# Patient Record
Sex: FEMALE | Race: WHITE | NOT HISPANIC OR LATINO | URBAN - METROPOLITAN AREA
[De-identification: names, ages, dates, MRNs, and addresses within clinical notes are randomized per-mention and may not be internally consistent; named-entity substitution may affect disease eponyms.]

---

## 2017-08-22 ENCOUNTER — OUTPATIENT (OUTPATIENT)
Dept: OUTPATIENT SERVICES | Facility: HOSPITAL | Age: 27
LOS: 1 days | End: 2017-08-22
Payer: COMMERCIAL

## 2017-08-22 DIAGNOSIS — Z01.818 ENCOUNTER FOR OTHER PREPROCEDURAL EXAMINATION: ICD-10-CM

## 2017-08-22 LAB
BLD GP AB SCN SERPL QL: POSITIVE — SIGNIFICANT CHANGE UP
HCT VFR BLD CALC: 36.1 % — SIGNIFICANT CHANGE UP (ref 34.5–45)
HGB BLD-MCNC: 11.5 G/DL — SIGNIFICANT CHANGE UP (ref 11.5–15.5)
MCHC RBC-ENTMCNC: 26.8 PG — LOW (ref 27–34)
MCHC RBC-ENTMCNC: 31.9 G/DL — LOW (ref 32–36)
MCV RBC AUTO: 84.1 FL — SIGNIFICANT CHANGE UP (ref 80–100)
PLATELET # BLD AUTO: 163 K/UL — SIGNIFICANT CHANGE UP (ref 150–400)
RBC # BLD: 4.29 M/UL — SIGNIFICANT CHANGE UP (ref 3.8–5.2)
RBC # FLD: 13.6 % — SIGNIFICANT CHANGE UP (ref 10.3–16.9)
RH IG SCN BLD-IMP: NEGATIVE — SIGNIFICANT CHANGE UP
WBC # BLD: 11.8 K/UL — HIGH (ref 3.8–10.5)
WBC # FLD AUTO: 11.8 K/UL — HIGH (ref 3.8–10.5)

## 2017-08-22 PROCEDURE — 86922 COMPATIBILITY TEST ANTIGLOB: CPT

## 2017-08-22 PROCEDURE — 86880 COOMBS TEST DIRECT: CPT

## 2017-08-22 PROCEDURE — 86850 RBC ANTIBODY SCREEN: CPT

## 2017-08-22 PROCEDURE — 86077 PHYS BLOOD BANK SERV XMATCH: CPT

## 2017-08-22 PROCEDURE — 86900 BLOOD TYPING SEROLOGIC ABO: CPT

## 2017-08-22 PROCEDURE — 86870 RBC ANTIBODY IDENTIFICATION: CPT

## 2017-08-22 PROCEDURE — 85027 COMPLETE CBC AUTOMATED: CPT

## 2017-08-22 PROCEDURE — 86921 COMPATIBILITY TEST INCUBATE: CPT

## 2017-08-22 PROCEDURE — 86901 BLOOD TYPING SEROLOGIC RH(D): CPT

## 2017-08-23 ENCOUNTER — INPATIENT (INPATIENT)
Facility: HOSPITAL | Age: 27
LOS: 2 days | Discharge: HOME | End: 2017-08-26
Attending: OBSTETRICS & GYNECOLOGY | Admitting: OBSTETRICS & GYNECOLOGY
Payer: COMMERCIAL

## 2017-08-23 VITALS — WEIGHT: 147.05 LBS | HEIGHT: 63 IN

## 2017-08-23 LAB
BLD GP AB SCN SERPL QL: POSITIVE — SIGNIFICANT CHANGE UP
RH IG SCN BLD-IMP: NEGATIVE — SIGNIFICANT CHANGE UP

## 2017-08-23 RX ORDER — ACETAMINOPHEN 500 MG
650 TABLET ORAL EVERY 6 HOURS
Qty: 0 | Refills: 0 | Status: DISCONTINUED | OUTPATIENT
Start: 2017-08-23 | End: 2017-08-24

## 2017-08-23 RX ORDER — OXYTOCIN 10 UNIT/ML
333.33 VIAL (ML) INJECTION
Qty: 20 | Refills: 0 | Status: DISCONTINUED | OUTPATIENT
Start: 2017-08-23 | End: 2017-08-26

## 2017-08-23 RX ORDER — METOCLOPRAMIDE HCL 10 MG
10 TABLET ORAL ONCE
Qty: 0 | Refills: 0 | Status: DISCONTINUED | OUTPATIENT
Start: 2017-08-23 | End: 2017-08-23

## 2017-08-23 RX ORDER — HEPARIN SODIUM 5000 [USP'U]/ML
5000 INJECTION INTRAVENOUS; SUBCUTANEOUS EVERY 12 HOURS
Qty: 0 | Refills: 0 | Status: DISCONTINUED | OUTPATIENT
Start: 2017-08-23 | End: 2017-08-26

## 2017-08-23 RX ORDER — OXYTOCIN 10 UNIT/ML
41.67 VIAL (ML) INJECTION
Qty: 20 | Refills: 0 | Status: DISCONTINUED | OUTPATIENT
Start: 2017-08-23 | End: 2017-08-26

## 2017-08-23 RX ORDER — CEFAZOLIN SODIUM 1 G
2000 VIAL (EA) INJECTION ONCE
Qty: 0 | Refills: 0 | Status: COMPLETED | OUTPATIENT
Start: 2017-08-23 | End: 2017-08-23

## 2017-08-23 RX ORDER — DOCUSATE SODIUM 100 MG
100 CAPSULE ORAL
Qty: 0 | Refills: 0 | Status: DISCONTINUED | OUTPATIENT
Start: 2017-08-23 | End: 2017-08-26

## 2017-08-23 RX ORDER — OXYTOCIN 10 UNIT/ML
41.67 VIAL (ML) INJECTION
Qty: 20 | Refills: 0 | Status: DISCONTINUED | OUTPATIENT
Start: 2017-08-23 | End: 2017-08-23

## 2017-08-23 RX ORDER — GLYCERIN ADULT
1 SUPPOSITORY, RECTAL RECTAL AT BEDTIME
Qty: 0 | Refills: 0 | Status: DISCONTINUED | OUTPATIENT
Start: 2017-08-23 | End: 2017-08-26

## 2017-08-23 RX ORDER — IBUPROFEN 200 MG
600 TABLET ORAL ONCE
Qty: 0 | Refills: 0 | Status: COMPLETED | OUTPATIENT
Start: 2017-08-23 | End: 2017-08-23

## 2017-08-23 RX ORDER — LANOLIN
1 OINTMENT (GRAM) TOPICAL
Qty: 0 | Refills: 0 | Status: DISCONTINUED | OUTPATIENT
Start: 2017-08-23 | End: 2017-08-26

## 2017-08-23 RX ORDER — DIPHENHYDRAMINE HCL 50 MG
25 CAPSULE ORAL EVERY 6 HOURS
Qty: 0 | Refills: 0 | Status: DISCONTINUED | OUTPATIENT
Start: 2017-08-23 | End: 2017-08-26

## 2017-08-23 RX ORDER — SODIUM CHLORIDE 9 MG/ML
1000 INJECTION, SOLUTION INTRAVENOUS
Qty: 0 | Refills: 0 | Status: DISCONTINUED | OUTPATIENT
Start: 2017-08-23 | End: 2017-08-26

## 2017-08-23 RX ORDER — SODIUM CHLORIDE 9 MG/ML
1000 INJECTION, SOLUTION INTRAVENOUS
Qty: 0 | Refills: 0 | Status: DISCONTINUED | OUTPATIENT
Start: 2017-08-23 | End: 2017-08-23

## 2017-08-23 RX ORDER — SODIUM CHLORIDE 9 MG/ML
1000 INJECTION, SOLUTION INTRAVENOUS ONCE
Qty: 0 | Refills: 0 | Status: COMPLETED | OUTPATIENT
Start: 2017-08-23 | End: 2017-08-23

## 2017-08-23 RX ORDER — IBUPROFEN 200 MG
600 TABLET ORAL EVERY 6 HOURS
Qty: 0 | Refills: 0 | Status: DISCONTINUED | OUTPATIENT
Start: 2017-08-23 | End: 2017-08-23

## 2017-08-23 RX ORDER — ACETAMINOPHEN 500 MG
650 TABLET ORAL EVERY 6 HOURS
Qty: 0 | Refills: 0 | Status: DISCONTINUED | OUTPATIENT
Start: 2017-08-23 | End: 2017-08-26

## 2017-08-23 RX ORDER — OXYCODONE AND ACETAMINOPHEN 5; 325 MG/1; MG/1
1 TABLET ORAL
Qty: 0 | Refills: 0 | Status: DISCONTINUED | OUTPATIENT
Start: 2017-08-23 | End: 2017-08-26

## 2017-08-23 RX ORDER — SIMETHICONE 80 MG/1
80 TABLET, CHEWABLE ORAL EVERY 4 HOURS
Qty: 0 | Refills: 0 | Status: DISCONTINUED | OUTPATIENT
Start: 2017-08-23 | End: 2017-08-26

## 2017-08-23 RX ORDER — TETANUS TOXOID, REDUCED DIPHTHERIA TOXOID AND ACELLULAR PERTUSSIS VACCINE, ADSORBED 5; 2.5; 8; 8; 2.5 [IU]/.5ML; [IU]/.5ML; UG/.5ML; UG/.5ML; UG/.5ML
0.5 SUSPENSION INTRAMUSCULAR ONCE
Qty: 0 | Refills: 0 | Status: DISCONTINUED | OUTPATIENT
Start: 2017-08-23 | End: 2017-08-26

## 2017-08-23 RX ORDER — CITRIC ACID/SODIUM CITRATE 300-500 MG
30 SOLUTION, ORAL ORAL ONCE
Qty: 0 | Refills: 0 | Status: COMPLETED | OUTPATIENT
Start: 2017-08-23 | End: 2017-08-23

## 2017-08-23 RX ORDER — OXYCODONE AND ACETAMINOPHEN 5; 325 MG/1; MG/1
2 TABLET ORAL EVERY 6 HOURS
Qty: 0 | Refills: 0 | Status: DISCONTINUED | OUTPATIENT
Start: 2017-08-23 | End: 2017-08-26

## 2017-08-23 RX ORDER — IBUPROFEN 200 MG
600 TABLET ORAL EVERY 6 HOURS
Qty: 0 | Refills: 0 | Status: DISCONTINUED | OUTPATIENT
Start: 2017-08-23 | End: 2017-08-24

## 2017-08-23 RX ORDER — FERROUS SULFATE 325(65) MG
325 TABLET ORAL DAILY
Qty: 0 | Refills: 0 | Status: DISCONTINUED | OUTPATIENT
Start: 2017-08-23 | End: 2017-08-26

## 2017-08-23 RX ADMIN — SODIUM CHLORIDE 125 MILLILITER(S): 9 INJECTION, SOLUTION INTRAVENOUS at 18:33

## 2017-08-23 RX ADMIN — SODIUM CHLORIDE 2000 MILLILITER(S): 9 INJECTION, SOLUTION INTRAVENOUS at 07:28

## 2017-08-23 RX ADMIN — Medication 600 MILLIGRAM(S): at 18:31

## 2017-08-23 RX ADMIN — Medication 600 MILLIGRAM(S): at 23:52

## 2017-08-23 RX ADMIN — SODIUM CHLORIDE 125 MILLILITER(S): 9 INJECTION, SOLUTION INTRAVENOUS at 07:15

## 2017-08-23 RX ADMIN — Medication 100 MILLIGRAM(S): at 07:30

## 2017-08-23 RX ADMIN — HEPARIN SODIUM 5000 UNIT(S): 5000 INJECTION INTRAVENOUS; SUBCUTANEOUS at 20:47

## 2017-08-23 RX ADMIN — Medication 30 MILLILITER(S): at 07:30

## 2017-08-23 RX ADMIN — Medication 600 MILLIGRAM(S): at 19:03

## 2017-08-23 NOTE — DISCHARGE NOTE OB - PATIENT PORTAL LINK FT
“You can access the [facility] Patient Portal, offered by [facility], by registering with the following website: [email or web address]”

## 2017-08-23 NOTE — DISCHARGE NOTE OB - CARE PLAN
Principal Discharge DX:	Postpartum state  Goal:	home  Instructions for follow-up, activity and diet:	md office in 1 and 5 weeks, nothing in vagina for 6 weeks.

## 2017-08-23 NOTE — DISCHARGE NOTE OB - CARE PROVIDER_API CALL
Josh Weeks), Obstetrics and Gynecology  [facility]  [address]  [place] [address]  Phone: [phone]  Fax: [phone]

## 2017-08-24 LAB
HCT VFR BLD CALC: 35.6 % — SIGNIFICANT CHANGE UP (ref 34.5–45)
HGB BLD-MCNC: 11.3 G/DL — LOW (ref 11.5–15.5)
MCHC RBC-ENTMCNC: 27.2 PG — SIGNIFICANT CHANGE UP (ref 27–34)
MCHC RBC-ENTMCNC: 31.7 G/DL — LOW (ref 32–36)
MCV RBC AUTO: 85.8 FL — SIGNIFICANT CHANGE UP (ref 80–100)
PLATELET # BLD AUTO: 178 K/UL — SIGNIFICANT CHANGE UP (ref 150–400)
RBC # BLD: 4.15 M/UL — SIGNIFICANT CHANGE UP (ref 3.8–5.2)
RBC # FLD: 13.7 % — SIGNIFICANT CHANGE UP (ref 10.3–16.9)
T PALLIDUM AB TITR SER: NEGATIVE — SIGNIFICANT CHANGE UP
WBC # BLD: 13.6 K/UL — HIGH (ref 3.8–10.5)
WBC # FLD AUTO: 13.6 K/UL — HIGH (ref 3.8–10.5)

## 2017-08-24 RX ORDER — IBUPROFEN 200 MG
600 TABLET ORAL EVERY 6 HOURS
Qty: 0 | Refills: 0 | Status: DISCONTINUED | OUTPATIENT
Start: 2017-08-24 | End: 2017-08-26

## 2017-08-24 RX ORDER — ACETAMINOPHEN 500 MG
650 TABLET ORAL EVERY 6 HOURS
Qty: 0 | Refills: 0 | Status: DISCONTINUED | OUTPATIENT
Start: 2017-08-24 | End: 2017-08-26

## 2017-08-24 RX ORDER — ACETAMINOPHEN 500 MG
650 TABLET ORAL EVERY 6 HOURS
Qty: 0 | Refills: 0 | Status: DISCONTINUED | OUTPATIENT
Start: 2017-08-24 | End: 2017-08-24

## 2017-08-24 RX ADMIN — Medication 1 APPLICATION(S): at 12:10

## 2017-08-24 RX ADMIN — Medication 1 TABLET(S): at 11:33

## 2017-08-24 RX ADMIN — Medication 650 MILLIGRAM(S): at 11:30

## 2017-08-24 RX ADMIN — Medication 600 MILLIGRAM(S): at 00:40

## 2017-08-24 RX ADMIN — Medication 650 MILLIGRAM(S): at 10:11

## 2017-08-24 RX ADMIN — Medication 100 MILLIGRAM(S): at 11:34

## 2017-08-24 RX ADMIN — HEPARIN SODIUM 5000 UNIT(S): 5000 INJECTION INTRAVENOUS; SUBCUTANEOUS at 20:40

## 2017-08-24 RX ADMIN — Medication 600 MILLIGRAM(S): at 12:15

## 2017-08-24 RX ADMIN — HEPARIN SODIUM 5000 UNIT(S): 5000 INJECTION INTRAVENOUS; SUBCUTANEOUS at 09:46

## 2017-08-24 RX ADMIN — Medication 600 MILLIGRAM(S): at 17:15

## 2017-08-24 RX ADMIN — Medication 600 MILLIGRAM(S): at 05:38

## 2017-08-24 RX ADMIN — Medication 325 MILLIGRAM(S): at 11:33

## 2017-08-24 RX ADMIN — Medication 600 MILLIGRAM(S): at 18:15

## 2017-08-24 RX ADMIN — Medication 600 MILLIGRAM(S): at 11:34

## 2017-08-24 RX ADMIN — Medication 650 MILLIGRAM(S): at 16:30

## 2017-08-24 RX ADMIN — Medication 600 MILLIGRAM(S): at 06:30

## 2017-08-24 RX ADMIN — Medication 650 MILLIGRAM(S): at 15:52

## 2017-08-24 NOTE — PROGRESS NOTE ADULT - ASSESSMENT
A/P  25yo  s/p c/s, POD #1  ,stable  1. Pain: well controlled on PO pain meds  2. GI: regular diet  3. : voiding  4. DVT prophylaxis: SQH, ambulation  5. Dispo: POD 3 or 4

## 2017-08-24 NOTE — LACTATION INITIAL EVALUATION - INFANT FEEDING PLAN COMMENT, OB PROFILE
day 2 of life, mom reports cracked left nipple, has been supplementing with formula. plans to bf 1 month and then wean to formula. assisted to deepen latch in cross cradle hold. discussed risks of formula supplementation on bfing and milk production. observed deep attachment with slow, rhythmic suck. mom reports comfort. reviewed guidelines. encouraged s2s and feeding on demand.

## 2017-08-24 NOTE — PROGRESS NOTE ADULT - SUBJECTIVE AND OBJECTIVE BOX
Patient evaluated at bedside.   She reports pain is well controlled.  She denies heavy vaginal bleeding.  She has been ambulating without assistance, voiding spontaneously, passing gas, tolerating regular diet and is breastfeeding.    Physical Exam:  Vital Signs Last 24 Hrs  T(C): 36.9 (24 Aug 2017 06:22), Max: 36.9 (24 Aug 2017 06:22)  T(F): 98.5 (24 Aug 2017 06:22), Max: 98.5 (24 Aug 2017 06:22)  HR: 77 (24 Aug 2017 06:22) (66 - 96)  BP: 97/64 (24 Aug 2017 06:22) (89/52 - 106/58)  BP(mean): --  RR: 16 (24 Aug 2017 06:22) (14 - 18)  SpO2: 95% (24 Aug 2017 06:22) (95% - 100%)    GA: NAD, A+0 x 3  Abd: soft, nontender, nondistended, no rebound or guarding, incision clean, dry and intact, uterus firm at midline,  below the umbilicus  : lochia WNL  Extremities: no calf tenderness                            11.5   11.8  )-----------( 163      ( 22 Aug 2017 11:12 )             36.1

## 2017-08-25 RX ADMIN — Medication 600 MILLIGRAM(S): at 03:00

## 2017-08-25 RX ADMIN — Medication 600 MILLIGRAM(S): at 13:52

## 2017-08-25 RX ADMIN — Medication 1 TABLET(S): at 08:48

## 2017-08-25 RX ADMIN — HEPARIN SODIUM 5000 UNIT(S): 5000 INJECTION INTRAVENOUS; SUBCUTANEOUS at 08:48

## 2017-08-25 RX ADMIN — HEPARIN SODIUM 5000 UNIT(S): 5000 INJECTION INTRAVENOUS; SUBCUTANEOUS at 20:58

## 2017-08-25 RX ADMIN — Medication 600 MILLIGRAM(S): at 02:07

## 2017-08-25 RX ADMIN — Medication 600 MILLIGRAM(S): at 14:30

## 2017-08-25 RX ADMIN — Medication 325 MILLIGRAM(S): at 08:48

## 2017-08-25 NOTE — PROGRESS NOTE ADULT - SUBJECTIVE AND OBJECTIVE BOX
Patient evaluated at bedside.   She reports pain is well controlled.  She denies heavy vaginal bleeding.  She has been ambulating without assistance, voiding spontaneously, passing gas, tolerating regular diet and is breastfeeding.    Physical Exam:  Vital Signs Last 24 Hrs  T(C): 36.7 (25 Aug 2017 06:17), Max: 36.8 (24 Aug 2017 14:02)  T(F): 98 (25 Aug 2017 06:17), Max: 98.3 (24 Aug 2017 14:02)  HR: 81 (25 Aug 2017 06:17) (80 - 82)  BP: 98/60 (25 Aug 2017 06:17) (94/64 - 98/60)  BP(mean): --  RR: 16 (25 Aug 2017 06:17) (16 - 16)  SpO2: 98% (25 Aug 2017 06:17) (98% - 98%)    GA: NAD, A+0 x 3  Abd: soft, nontender, nondistended, no rebound or guarding, incision clean, dry and intact, uterus firm at midline,  below the umbilicus  : lochia WNL  Extremities: no calf tenderness                            11.3   13.6  )-----------( 178      ( 24 Aug 2017 11:38 )             35.6

## 2017-08-25 NOTE — PROGRESS NOTE ADULT - ASSESSMENT
A/P  27yo  s/p c/s, POD #2  ,stable  1. Pain: well controlled on PO pain meds  2. GI: regular  3. : voiding  4. DVT prophylaxis: sqh, ambulation  5. Dispo: pod 3 or 4

## 2017-08-26 VITALS
DIASTOLIC BLOOD PRESSURE: 74 MMHG | HEART RATE: 94 BPM | SYSTOLIC BLOOD PRESSURE: 113 MMHG | RESPIRATION RATE: 16 BRPM | TEMPERATURE: 98 F | OXYGEN SATURATION: 100 %

## 2017-08-26 PROCEDURE — 86780 TREPONEMA PALLIDUM: CPT

## 2017-08-26 PROCEDURE — 86900 BLOOD TYPING SEROLOGIC ABO: CPT

## 2017-08-26 PROCEDURE — 86850 RBC ANTIBODY SCREEN: CPT

## 2017-08-26 PROCEDURE — 86870 RBC ANTIBODY IDENTIFICATION: CPT

## 2017-08-26 PROCEDURE — 85027 COMPLETE CBC AUTOMATED: CPT

## 2017-08-26 PROCEDURE — 36415 COLL VENOUS BLD VENIPUNCTURE: CPT

## 2017-08-26 PROCEDURE — 86901 BLOOD TYPING SEROLOGIC RH(D): CPT

## 2017-08-26 RX ADMIN — Medication 325 MILLIGRAM(S): at 11:54

## 2017-08-26 RX ADMIN — Medication 1 TABLET(S): at 11:54

## 2017-08-26 NOTE — PROGRESS NOTE ADULT - ASSESSMENT
A/P  25yo  s/p c/s, POD #3  ,stable  1. Pain: well controlled on PO pain meds  2. GI: regular  3. : voiding  4. DVT prophylaxis: sqh, ambulation  5. Dispo: pod 3 or 4

## 2017-08-26 NOTE — PROGRESS NOTE ADULT - SUBJECTIVE AND OBJECTIVE BOX
Patient evaluated at bedside.   She reports pain is well controlled.  She denies heavy vaginal bleeding.  She has been ambulating without assistance, voiding spontaneously, passing gas, tolerating regular diet and is breastfeeding.    Vital Signs Last 24 Hrs  T(C): 36.7 (26 Aug 2017 05:47), Max: 36.8 (25 Aug 2017 10:00)  T(F): 98.1 (26 Aug 2017 05:47), Max: 98.2 (25 Aug 2017 10:00)  HR: 94 (26 Aug 2017 05:47) (74 - 94)  BP: 113/74 (26 Aug 2017 05:47) (98/60 - 117/76)  BP(mean): --  RR: 16 (26 Aug 2017 05:47) (16 - 16)  SpO2: 100% (26 Aug 2017 05:47) (98% - 100%)    GA: NAD, A+0 x 3  Abd: soft, nontender, nondistended, no rebound or guarding, provera wound vac clean, dry and intact, uterus firm at midline, 1fb below the umbilicus  : lochia WNL  Extremities: no calf tenderness                            11.3   13.6  )-----------( 178      ( 24 Aug 2017 11:38 )             35.6

## 2017-08-30 DIAGNOSIS — Z3A.39 39 WEEKS GESTATION OF PREGNANCY: ICD-10-CM

## 2017-08-30 DIAGNOSIS — O34.211 MATERNAL CARE FOR LOW TRANSVERSE SCAR FROM PREVIOUS CESAREAN DELIVERY: ICD-10-CM

## 2017-08-30 DIAGNOSIS — Z87.410 PERSONAL HISTORY OF CERVICAL DYSPLASIA: ICD-10-CM

## 2018-02-15 NOTE — PATIENT PROFILE OB - BREAST MILK PROVIDES PROTECTION AGAINST INFECTION
Statement Selected
Include Location In Plan?: Yes
Detail Level: Generalized
Additional Note: Normal full body skin exam, follow up annually or sooner if there are any changes

## 2023-09-22 NOTE — DISCHARGE NOTE OB - BREASTFEEDING PROVIDES MATERNAL HEALTH BENEFITS, DECREASED PREMENOPAUSAL BREAST CANCER, OVARIAN CANCER AND TYPE II DIABETES MELLITUS
COVID-19 (Coronavirus Disease 2019)    WHAT YOU NEED TO KNOW:    What do I need to know about coronavirus disease 2019 (COVID-19)? COVID-19 is the disease caused by the novel (new) coronavirus first discovered in December 2019. Coronaviruses generally cause upper respiratory (nose, throat, and lung) infections, such as a cold. The new virus can also cause serious lower respiratory conditions, such as pneumonia or acute respiratory distress syndrome (ARDS). Anyone can develop serious problems from the new virus, but your risk is higher if you are 65 or older. A weak immune system, diabetes, or a heart or lung condition can also increase your risk.    What are the signs and symptoms of COVID-19? You may not develop any signs or symptoms. Signs and symptoms that do develop usually start about 5 days after infection but can take 2 to 14 days. Signs and symptoms range from mild to severe. You may feel like you have the flu or a bad cold. Information on COVID-19 is still being learned. Tell your healthcare provider if you think you were infected but develop signs or symptoms not listed below:  •A cough  •Shortness of breath or trouble breathing that may become severe  •A fever of at least 100.4°F, or 38°C (may be lower in adults 65 or older)  •Chills that might include shaking  •Muscle pain, body aches, or a headache  •A sore throat  •Suddenly not being able to taste or smell anything  •Feeling mentally and physically tired (fatigue)  •Congestion (stuffy head and nose), or a runny nose.  •Diarrhea, nausea, or vomiting    How is COVID-19 diagnosed? If you think you have COVID-19, call your healthcare provider. In some areas, testing is only done if a person has severe symptoms or is hospitalized. Testing is done more widely in other places. Your provider will tell you what to do based on your symptoms and the rules in your area. In general, the following may be used:   •A viral test shows if you have a current infection. Samples are taken from your nose and throat, usually with swabs. You may need to wait several days to get the test results. Your healthcare provider will tell you how to get your results. You will need to quarantine (stay physically away from others) until you get your results. If results show you have COVID-19, you will need to quarantine until you are well. Your provider or other health official may give you more directions. You will also need to prevent another infection until it is known if you can get COVID-19 again.  •An antibody test shows if you had a past infection. Blood samples are used for this test. Antibodies are made by your immune system to attack the virus that causes COVID-19. Antibodies will form 1 to 3 weeks after you are infected. It is not known if antibodies prevent a second infection, or for how long a person might be protected. If you have antibodies, you will still need to be careful around others until more is known.  •CT scans or x-rays may be used to check for signs of pneumonia. The 2019 coronavirus causes a specific kind of pneumonia, usually in both lungs.      How is COVID-19 treated? No medicine or specific treatment is currently approved for COVID-19. The following may be used to manage your symptoms or treat the effects of COVID-19:   •Mild symptoms may get better on their own. If you do not need to be treated in a hospital, you will be given instructions to use at home. Your condition will be closely monitored. You will need to watch for worsening symptoms and seek immediate care if needed. Talk to your healthcare provider about the following:?Relieve your symptoms. To soothe a sore throat, gargle with warm salt water, or use throat lozenges or a throat spray. Your healthcare provider may recommend a cough medicine. Drink more liquids to thin and loosen mucus and to prevent dehydration. Use decongestants or saline drops as directed for nasal congestion.  ?NSAIDs or acetaminophen can help lower a fever and relieve body aches or a headache. Follow directions. If not taken correctly, NSAIDs can cause kidney damage and acetaminophen can cause liver damage.    •Severe or life-threatening symptoms are treated in the hospital. You may need a combination of the following:?Medicines may be given to reduce inflammation or to fight the virus. You may also need blood thinners to prevent or treat blood clots. If you have a deep vein thrombosis (DVT) or pulmonary embolism (PE), you may need to keep using blood thinners for 3 months.  ?Extra oxygen may be given if you have respiratory failure. This means your lungs cannot get enough oxygen into your blood and out to your organs. Extra oxygen can help prevent organ failure.  ?A ventilator may be used to help you breathe.  ?Convalescent plasma (part of blood) from a patient who has recovered from COVID-19 may be used. The plasma contains antibodies that can help your body fight the infection. Convalescent plasma is only given to patients who have severe signs and symptoms.        How does the 2019 coronavirus spread? The virus spreads quickly and easily. You can become infected if you are in contact with a large amount of the virus, even for a short time. You can also become infected by being around a small amount of virus for a long time. The following are ways the virus is thought to spread, but more information may be coming:   •Droplets are the most common way all coronaviruses spread. The virus can travel in droplets that form when a person talks, coughs, or sneezes. Anyone who breathes in the droplets or gets them in his or her eyes can become infected with the virus. Close personal contact with an infected person is thought to be the main way the virus spreads. Close personal contact means you are within 6 feet (2 meters) of the person.  •Person-to-person contact can spread the virus. For example, a person with the virus on his or her hands can spread it by shaking hands with someone. At this time, it does not appear that the virus can be passed to a baby during pregnancy or delivery. The baby can be infected after he or she is born through person-to-person contact. The virus also does not appear to spread in breast milk. If you are pregnant or breastfeeding, talk to your healthcare provider or obstetrician about any concerns you have.  •The virus can stay on objects and surfaces. A person can get the virus on his or her hands by touching the object or surface. Infection happens if the person then touches his or her eyes or mouth with unwashed hands. It is not yet known how long the virus can stay on an object or surface. That is why it is important to clean all surfaces that are used regularly.  •An infected animal may be able to infect a person who touches it. This may happen at live markets or on a farm.      How can everyone lower the risk for COVID-19? The best way to prevent infection is to avoid anyone who is infected, but this can be hard to do. An infected person can spread the virus before signs or symptoms begin, or even if signs or symptoms never develop. The following can help lower the risk for infection:   Limit the Spread of Infectious Disease    •Wash your hands often throughout the day. Use soap and water. Rub your soapy hands together, lacing your fingers. Wash the front and back of each hand, and in between your fingers. Use the fingers of one hand to scrub under the fingernails of the other hand. Wash for at least 20 seconds. Rinse with warm, running water for several seconds. Then dry your hands with a clean towel or paper towel. Use hand  that contains alcohol if soap and water are not available. Do not touch your eyes, nose, or mouth without washing your hands first. Teach children how to wash their hands and use hand .  Handwashing  •Cover a sneeze or cough. This prevents droplets from traveling from you to others. Turn your face away and cover your mouth and nose with a tissue. Throw the tissue away. Use the bend of your arm if a tissue is not available. Then wash your hands well with soap and water or use hand . Turn and cover your face if you are around someone who is sneezing or coughing. Teach children how to cover a cough or sneeze.  •Follow worldwide, national, and local social distancing guidelines. Social distancing means people avoid close physical contact so the virus cannot spread from one person to another. Keep at least 6 feet (2 meters) between you and others. Also keep this distance from anyone who comes to your home, such as someone making a delivery.  •Make a habit of not touching your face. It is not known how long the virus can stay on objects and surfaces. If you get the virus on your hands, you can transfer it to your eyes, nose, or mouth and become infected. You can also transfer it to objects, surfaces, or people. Be aware of what you touch when you go out. Examples include handrails and elevator buttons. Try not to touch anything with bare hands unless it is necessary. Wash your hands before you leave your home and when you return.  •Clean and disinfect high-touch surfaces and objects often. Use a disinfecting solution or wipes. You can make a solution by diluting 4 teaspoons of bleach in 1 quart (4 cups) of water. Clean and disinfect even if you think no one living in or coming to your home is infected with the virus. You can wipe items with a disinfecting cloth before you bring them into your home. Wash your hands after you handle anything you bring into your home.  •Make your immune system as healthy as possible. A weakened immune system makes you more vulnerable to the new coronavirus. No COVID-19 vaccine is available yet. Vaccines such as the flu and pneumonia vaccines can help your immune system. Your healthcare provider can tell you which vaccines to get, and when to get them. Keep your immune system as strong as possible. Do not smoke. Eat healthy foods, exercise regularly, and try to manage stress. Go to bed and wake up at the same times each day.        How do I follow social distancing guidelines to help lower the risk for COVID-19? National and local social distancing rules vary. Rules may change over time as restrictions are lifted. Restrictions may return if an outbreak happens where you live. It is important to know and follow all current social distancing rules in your area. The following are general guidelines:  •Limit trips out of your home. You may be able to have food, medicines, and other supplies delivered. If possible, have delivered items left at your door or other area. Try not to have someone hand you an item. You will be so close to the person that the virus can spread between you.  •Do not have close physical contact with anyone who does not live in your home. Do not shake hands with, hug, or kiss a person as a greeting. Stand or walk as far from others as possible. If you must use public transportation (such as a bus or subway), try to sit or stand away from others. You can stay safely connected with others through phone calls, e-mail messages, social media websites, and video chats. Check in on anyone who may be having a hard time socially distancing, or who lives alone. Ask administrators at nursing homes or long-term care facilities how you can safely communicate with someone living there.  •Wear a cloth face covering around others who do not live in your home. Face coverings help prevent the virus from spreading to others in droplets. You can use a clear face covering if someone needs to read your lips. This is a cloth covering that has plastic over the mouth area so your lips can be seen. Do not use coverings that have breathing valves or vents. The virus can travel out of the valve or vent and be spread to others. Do not take your covering off to talk, cough, or sneeze. Do not use coverings on children younger than 2 years or on anyone who has breathing problems or cannot remove it.  •Only allow medical or other necessary professionals into your home. Wear your face covering, and remind professionals to wear a face covering. Remind them to wash their hands when they arrive and before they leave. Do not let anyone who does not live in your home in, even if the person is not sick. A person can pass the virus to others before symptoms of COVID-19 begin. Some people never even develop symptoms. Children commonly have mild symptoms or no symptoms. It may be hard to tell a child not to hug or kiss you. Explain that this is how he or she can help you stay healthy.  •Do not go to someone else's home unless it is necessary. Do not go over to visit, even if the person is lonely. Only go if you need to help him or her. Make sure you both wear face coverings while you are there.  •Avoid large gatherings and crowds. Gatherings or crowds of 10 or more individuals can cause the virus to spread. Examples of gatherings include parties, sporting events, [employer] services, and conferences. Crowds may form at beaches, nicole, and tourist attractions. Protect yourself by staying away from large gatherings and crowds.  •Ask your healthcare provider for other ways to have appointments. You may be able to have appointments without having to go into the provider's office. Some providers offer phone, video, or other types of appointments. You may also be able to get prescriptions for a few months of your medicines at a time.  •Stay safe if you must go out to work. You may have a job that can only be done outside your home. Keep physical distance between you and other workers as much as possible. Follow your employer's rules so everyone stays safe.      What should I do if I have COVID-19 and am recovering at home? Healthcare providers will give you specific instructions to follow. The following are general guidelines to remind you how to keep others safe until you are well:   •Wash your hands often. Use soap and water as much as possible. You can use hand  that contains alcohol if soap and water are not available. Do not share towels with anyone. If you use paper towels, throw them away in a lined trash can kept in your room or area. Use a covered trash can, if possible.  •Do not go out of your home unless it is necessary. You may have to go to your healthcare provider's office for check-ups or to get prescription refills. Do not arrive at the provider's office without an appointment. Providers have to make their offices safe for staff and other patients.  •Do not have close physical contact with anyone unless it is necessary. Only have close physical contact with a person giving direct care, or a baby or child you must care for. Family members and friends should not visit you. If possible, stay in a separate area or room of your home if you live with others. No one should go into the area or room except to give you care. You can visit with others by phone, video chat, e-mail, or similar systems. It is important to stay connected with others in your life while you recover.  •Wear a face covering while others are near you. This can help prevent droplets from spreading the virus when you talk, sneeze, or cough. Put the covering on before anyone comes into your room or area. Remind the person to cover his or her nose and mouth before going in to provide care for you.  •Do not share items. Do not share dishes, towels, or other items with anyone. Items need to be washed after you use them.  •Protect your baby. Wash your hands with soap and water often throughout the day. Wear a clean face covering while you breastfeed, or while you express or pump breast milk. If possible, ask someone who is well to care for your baby. You can put breast milk in bottles for the person to use, if needed. Talk to your healthcare provider if you have any questions or concerns about caring for or bonding with your baby. He or she will tell you when to bring your baby in for check-ups and vaccines. He or she will also tell you what to do if you think your baby was infected with the new virus.  •Do not handle live animals. Until more is known, it is best not to touch, play with, or handle live animals. Some animals, including pets, have been infected with the new coronavirus. Do not handle or care for animals until you are well. Care includes feeding, petting, and cuddling your pet. Do not let your pet lick you or share your food. Ask someone who is not infected to take care of your pet, if possible. If you must care for a pet, wear a face covering. Wash your hands before and after you give care.  •Follow directions from your healthcare provider for being around others after you recover. You will need to wait at least 10 days after symptoms first appeared. Then you will need to have no fever for 24 hours without fever medicine, and no other symptoms. A loss of taste or smell may continue for several months. It is considered okay to be around others if this is your only symptom. It is not known for sure if or for how long a recovered person can pass the virus to others. Your provider may give you instructions, such as continuing social distancing or wearing a face covering around others.  How should I take care of someone who has COVID-19? If the person lives in another home, arrange for a time to give care. Remember to bring a few pairs of disposable gloves and a cloth face covering. The following are general guidelines to help you safely care for anyone who has COVID-19:  •Wash your hands often. Wash before and after you go into the person's home, area, or room. Throw paper towels away in a lined trash can that has a lid, if possible.  •Do not allow others to go near the person. No one should come into the person's home unless it is necessary. If possible, the person should be in a separate area or room if he or she lives with others. Keep the room's door shut unless you need to go in or out. Have others call, video chat, or e-mail the person if he or she is feeling well enough. The person may feel lonely if he or she is kept separate for a long period of time. Safe communication can help him or her stay connected to family and friends.  •Make sure the person's room has good air flow. You may be able to open the window if the weather allows. An air conditioner can also be turned on to help air move.  •Contact the person before you go in to give care. Make sure the person is wearing a face covering. Remind him or her to wash his or her hands with soap and water. He or she can use hand  that contains alcohol if soap and water are not available. Put on a face covering before you go in to give care.  •Wear gloves while you give care and clean. Clean items the person uses often. Clean countertops, cooking surfaces, and the fronts and insides of the microwave and refrigerator. Clean the shower, toilet, the area around the toilet, the sink, the area around the sink, and faucets. Gather used laundry or bedding. Wash and dry items on the warmest settings the fabric allows. Wash dishes and silverware in hot, soapy water or in a .  •Anything you throw away needs to go into a lined trash can. When you need to empty the trash, close the open end of the lining and tie it closed. This helps prevent items the virus is on from spilling out of the trash. Remove your gloves and throw them away. Wash your hands.      Where can I find more information?   •[facility] [employer] Prevention  [address] [place] [address]  Phone: 1-793.902.7082  Web Address: http://www.cdc.gov    What should I do if I think I or someone I know may be infected? Do the following to protect others:   •If emergency care is needed, tell the  about the possible infection, or call ahead and tell the emergency department.  •Call a healthcare provider for instructions if symptoms are mild. Anyone who may be infected should not arrive without calling first. The provider will need to protect staff members and other patients.  •The person who may be infected needs to wear a face covering while getting medical care. This will help lower the risk of infecting others. Coverings are not used for anyone who is younger than 2 years, has breathing problems, or cannot remove it. The provider can give you instructions for anyone who cannot wear a covering.      Call your local emergency number (911 in the ) or an emergency department if:   •You have trouble breathing or shortness of breath at rest.  •You have chest pain or pressure that lasts longer than 5 minutes.  •You become confused or hard to wake.  •Your lips or face are blue.  •You have a fever of 104°F (40°C) or higher.  When should I call my doctor?   •You do not have symptoms of COVID-19 but had close physical contact within 14 days with someone who tested positive.  •You have questions or concerns about your condition or care.      CARE AGREEMENT:  You have the right to help plan your care. Learn about your health condition and how it may be treated. Discuss treatment options with your healthcare providers to decide what care you want to receive. You always have the right to refuse treatment.       COVID-19 (Enfermedad por coronavirus 2019)  LO QUE NECESITA SABER:  ¿Qué necesito saber acerca de la enfermedad por coronavirus 2019 (COVID-19)?COVID-19 es la enfermedad causada por el nuevo coronavirus descubierto por primera vez en diciembre de 2019. Los coronavirus generalmente causan infecciones de las vías respiratorias superiores (nariz, garganta y pulmones), paulo un resfriado. El nuevo virus también puede causar afecciones respiratorias inferiores graves, paulo la neumonía o el síndrome de dificultad respiratoria aguda (SDRA). Cualquier persona puede desarrollar problemas graves a causa del nuevo virus, marisol el riesgo es mayor si tiene 65 años o más. Un sistema inmunitario débil, la diabetes o brandan enfermedad cardíaca o pulmonar también pueden aumentar el riesgo.    ¿Cuáles son los signos y síntomas de la COVID-19?Es posible que no presente ningún signo o síntoma. Los signos y síntomas que se presentan suelen empezar unos 5 días después de la infección marisol pueden tardar de 2 a 14 días. Los signos y síntomas pueden variar de leves a severos. Puede sentir paulo si tuviera gripe o un resfriado joe. La información sobre COVID-19 todavía se está aprendiendo. Dígale a ahumada médico si brian que se ha infectado marisol desarrolla signos o síntomas que no se enumeran a continuación:  •Tos  •Falta de aliento o dificultad para respirar que puede llegar a ser grave  •Brandan fiebre de, al menos, 100.4 °F, o 38 °C (puede ser más baja en los adultos de 65 años o más)  •Escalofríos que pueden incluir temblores  •Dolor muscular, syed corporales o dolor de callie  •El dolor de garganta  •De repente, no ser capaz de probar u oler nada  •Sensación de cansancio físico y mental (fatiga)  •Congestión (de la nariz y la callie) o flujo nasal  •Diarrea, náuseas o vómitos  ¿Cómo se diagnostica la COVID-19?Llame a ahumada médico si piensa que puede tener COVID-19. En algunas zonas, solo se realizan pruebas si brandan persona tiene síntomas graves o es hospitalizada. Las pruebas se hacen más ampliamente en otros lugares. Ahumada médico le dirá lo que debe hacer basándose en fatou síntomas y en las normas de ahumada esmer. En general, se puede utilizar lo siguiente:   •Un examen viralmuestra si tiene brandan infección actualmente. Se dat muestras de la nariz y la garganta, usualmente con hisopos. Es posible que tenga que esperar varios días para obtener los resultados de la prueba. Ahumada médico le dirá cómo obtener los resultados. Tendrá que ponerse en cuarentena (mantenerse físicamente alejado de los demás) hasta que obtenga los resultados. Si los resultados muestran que tiene COVID-19, tendrá que ponerse en cuarentena hasta que esté indu. Ahumada médico u otro oficial de raheel pueden darle más instrucciones. También tendrá que prevenir otra infección hasta que se sepa si puede contraer COVID-19 de nuevo.  •Brandan prueba de anticuerposmuestra si tuvo brandan infección en el pasado. Para esta prueba se utilizan muestras de danna. Los anticuerpos son producidos por el sistema inmunitario para atacar el virus que causa la COVID-19. Los anticuerpos se formarán de 1 a 3 semanas después de que se contagie. No se sabe si los anticuerpos previenen brandan segunda infección, o por cuánto tiempo brandan persona podría estar protegida. Si tiene anticuerpos, tendrá que tener cuidado con los demás hasta que se sepa más.  •Tomografías o radiografíaspodrían realizarse para comprobar si existen signos de neumonía. El coronavirus 2019 causa un tipo específico de neumonía, generalmente en ambos pulmones.    ¿Cómo se trata la COVID-19?Ningún medicamento o tratamiento específico está actualmente aprobado para la COVID-19. Lo siguiente puede utilizarse para controlar los síntomas o tratar los efectos de la COVID-19:   •Los síntomas levespodrían mejorar por sí solos. Si no necesita ser tratado en un hospital, se le darán instrucciones para que siga en ahumada casa. Controlarán atentamente ahumada estado. Deberá estar atento al empeoramiento de los síntomas y buscar atención inmediata si es necesario. Hable con ahumada médico acerca de lo siguiente:?Aliviar los síntomas.Para aliviar el dolor de garganta, tanesha gárgaras con agua salada tibia, o use pastillas para la garganta o un aerosol para la garganta. Ahumada médico puede recomendarle un medicamento para la tos. Brittney más líquidos para disolver y aflojar la mucosidad y para prevenir la deshidratación. Use descongestionantes o gotas de solución salina paulo se indica para la congestión nasal.  ?Los CODY o el acetaminofenopueden ayudar a bajar la fiebre y aliviar los syed corporales o el dolor de callie. Siga las indicaciones. Si no se dat correctamente, los CODY pueden causar sangrado estomacal o daño renal y el acetaminofeno puede dañar hepático.  •Los síntomas severos o potencialmente mortalesse tratan en el hospital. Es posible que usted necesite brandan combinación de los siguientes:?Los medicamentospueden administrarse para reducen la inflamación o combatir el virus. También podría necesitar anticoagulantes para prevenir o tratar los coágulos de danna. Si tiene trombosis venosa profunda (TVP) o embolia pulmonar (PE), vicky vez necesite seguir usando anticoagulantes oralia 3 meses.  ?El oxígeno adicionalpodría administrarse si tiene insuficiencia respiratoria. [employer] significa que los pulmones no pueden llevar suficiente oxígeno a la danna y a los órganos. El oxígeno extra puede ayudar a prevenir la insuficiencia orgánica.  ?Un respiradorpodría usarse para ayudarlo a respirar.  ?El plasma (parte de la danna) de convalecientede un paciente que se ha recuperado de la COVID-19 puede utilizarse. El plasma contiene anticuerpos que pueden ayudar a ahumada cuerpo a combatir la infección. El plasma de convaleciente solo se administra a pacientes que tienen signos y síntomas severos.  ¿Cómo se propaga el coronavirus 2019?El virus se propaga rápida y fácilmente. Puede infectarse si está en contacto con brandan gran cantidad del virus, incluso oralia poco tiempo. También puede infectarse por estar cerca de brandan pequeña cantidad del virus oralia mucho tiempo. A continuación se indican las formas en que se brian que se propaga el virus, marisol es posible que surja más información:   •Las gotitas son la forma más común de propagación de todos los coronavirus.El virus puede viajar en gotitas que se kesha cuando brandan persona habla, tose o estornuda. Cualquiera que respire las gotitas o que las gotitas se le metan en los ojos puede infectarse con el virus. Se brian que el contacto personal cercano con brandan persona infectada es la principal forma de propagación del virus. El contacto personal cercano significa estar a menos de 6 pies (2 metros) de otra persona.  •El contacto de persona a persona puede propagar el virus.Por ejemplo, brandan persona con el virus en fatou amie puede propagarlo al darle la mano a alguien. En abigail momento, no parece que el virus pueda transmitirse a un bebé oralia el embarazo o el parto. El bebé puede infectarse después de nacer por contacto de persona a persona. El virus tampoco parece propagarse por la leche materna. Si está embarazada o amamantando, hable con ahumada médico u obstetra sobre cualquier preocupación que tenga.  •El virus puede permanecer en objetos y superficies.Brandan persona puede contraer el virus en fatou amie al tocar el objeto o la superficie. La infección se produce si la persona se toca los ojos o la boca sin antes lavarse las amie. Aún no se sabe cuánto tiempo puede permanecer el virus en un objeto o superficie. Por eso es importante limpiar todas las superficies que se usan regularmente.  •Un animal infectado puede ser capaz de infectar a brandan persona que lo toque.[employer] puede ocurrir en mercados vivos o en brandan hadley.  ¿Cómo puede todo el jose reducir el riesgo de COVID-19?La mejor manera de prevenir la infección es evitar a cualquiera que esté infectado, marisol esto puede ser difícil de lograr. Brandan persona infectada puede propagar el virus antes de que aparezcan los signos o síntomas, o incluso si los signos o síntomas nunca se desarrollan. Lo siguiente puede ayudar a reducir el riesgo de infección:   Limite la propagación de las enfermedades infecciosas  •Lávese las amie con frecuencia oralia el día.Utilice agua y jabón. Frótese las amie enjabonadas, entrelazando los dedos. Lávese el frente y el dorso de cada mano, y entre los dedos. Use los dedos de brandan mano para restregar debajo de las uñas de la otra mano. Lávese oralia al menos 20 segundos. Enjuague con agua corriente caliente oralia varios segundos. Luego séquese las amie con brandan toalla limpia o brandan toalla de papel. Puede usar un desinfectante para amie que contenga alcohol, si no hay agua y jabón disponibles. No se toque los ojos, la nariz o la boca sin antes lavarse las amie. Enseñe a los niños a lavarse las amie y a usar el desinfectante de amie.  Lavado de amie  •Cúbrase al toser o estornudar.[employer] que las gotitas viajen de usted a los demás. Gire la deo y cúbrase la boca y la nariz con un pañuelo. Deseche el pañuelo. Use el ángulo del brazo si no tiene un pañuelo disponible. Luego lávese las amie con agua y jabón o use un desinfectante de amie. Gire la callie y cúbrase si está cerca de alguien que está estornudando o tosiendo. Enséñeles a los niños a cubrirse al toser o estornudar.  •Siga las pautas de distanciamiento social a nivel local, nacional y mundial.El distanciamiento social significa que las personas evitan el contacto físico cercano para que el virus no se propague de brandan persona a otra. Mantenga al menos 6 pies (2 metros) de distancia entre usted y los demás. También mantenga esta distancia de cualquiera que venga a ahumada casa, paulo alguien que tanesha brandan entrega.  •Acostúmbrese a no tocarse la deo.No se sabe cuánto tiempo puede permanecer el virus en los objetos y las superficies. Si tiene el virus en las amie, puede transferirlo a los ojos, la nariz o la boca e infectarse. También puede transferirlo a los objetos, las superficies o las personas. Tenga cuidado con lo que toca cuando sale. Por ejemplo, los pasamanos y botones de ascensor. Intente no tocar nada con las amie descubiertas a menos que sea necesario. Lávese las amie antes de salir de ahumada casa y cuando regresa.  •Limpie y desinfecte a menudo los objetos y las superficies de alto contacto.Use brandan solución o toallitas desinfectantes. Puede hacer brandan solución diluyendo 4 cucharaditas de lejía en 1 cuarto de galón (4 tazas) de agua. Limpie y desinfecte aunque piense que nadie que viva o haya entrado en ahumada casa esté infectado con el virus. Puede limpiar los objetos con un paño desinfectante antes de llevarlos a ahumada casa. Lávese las amie después de manipular cualquier cosa que traiga a ahumada casa.  •Tanesha que ahumada sistema inmunitario esté lo más saludable posible.Un sistema inmunitario debilitado lo hace más vulnerable al nuevo coronavirus. No hay ninguna vacuna contra la COVID-19 disponible todavía. Las vacunas, paulo la vacuna contra la gripe y la neumonía, pueden ayudar al sistema inmunitario. Ahumada médico le indicará qué vacunas debe recibir y cuándo aplicárselas. Mantenga ahumada sistema inmunitario lo más joe posible. No fume. Consuma alimentos saludables, tanesha ejercicio regularmente e intente controlar el estrés. Acuéstese y levántese a la misma hora todos los días.   Alimentos saludables  ¿Cómo sigo las pautas de distanciamiento social para ayudar a reducir el riesgo de COVID-19?Las normas de distanciamiento social nacionales y locales varían. Las reglas pueden cambiar con el tiempo a medida que se levantan las restricciones. Las restricciones pueden volver a aplicarse si se produce un brote en el lugar donde usted vive. Es importante conocer y seguir todas las reglas de distanciamiento social actuales en ahumada área. Las siguientes son reglas generales al respecto:  •Limite los viajes fuera de ahumada casa.Es posible que se le entreguen alimentos, medicinas y otros suministros. Si es posible, tanesha que dejen los objetos que le entregan en ahumada jessee o en otra área. Intente que nadie le entregue un objeto en mano. Estará tan cerca de la persona que el virus puede propagarse entre ustedes.  •No tenga contacto físico cercano con nadie que no viva en ahumada casa.No le dé la mano, abrace o bese a brandan persona paulo saludo. Párese o camine lo más lejos posible de los demás. Si tiene que usar el transporte público (paulo el autobús o el metro), intente sentarse o pararse lejos de los demás. Puede mantenerse conectado de forma diaz con los demás a través de llamadas telefónicas, mensajes de correo electrónico, sitios web de medios sociales y videochats. Verifique cómo están las personas que pueden tener dificultades para distanciarse socialmente, o que viven solas. Pregunte a los administradores de los asilos de ancianos o de las instalaciones de cuidados a maikol plazo cómo puede comunicarse con seguridad con alguien que vive allí.  •Use un tapabocas de sami cuando esté cerca de otras personas que no viven en ahumada casa.Los tapabocas ayudan evitar que el virus se propague a otras personas en las gotitas. Puede usar un tapabocas transparente si alguien necesita leer fatou labios. Abigail es un tapabocas con un plástico sobre el área de la boca para que se puedan mitra los labios. No utilice tapabocas que tengan válvulas de respiración o respiraderos. El virus puede salir por la válvula o el respiradero y contagiar a otros. No se quite el tapabocas para hablar, toser o estornudar. No utilice tapabocas en niños menores de 2 años ni en personas que tengan problemas respiratorios o no puedan quitárselo  •Permita que solo los profesionales médicos u otros profesionales ingresen a ahumada casa.Use el tapabocas y recuérdeles a los profesionales que usen un tapabocas. Recuérdeles que se laven las amie cuando lleguen y antes de irse. No deje entrar a nadie que no viva en ahumada casa, aunque no esté enfermo. Brandan persona puede contagiar el virus a otros antes de que comiencen los síntomas de COVID-19. Algunas personas ni siquiera desarrollan síntomas. Los niños suelen tener síntomas leves o ningún síntoma. Puede ser difícil decirle a un suman que no lo abrace ni lo bese. Explíquele que así es paulo puede ayudarlo a mantenerse saludable.  •No vaya a la casa de otra persona, a menos que sea necesario.No vaya de visita, aunque la persona esté yulissa. Vaya solo si necesita ayudarla. Asegúrese de que ambos usen un tapabocas mientras esté allí.  •Evite las grandes reuniones y las multitudes.Las reuniones o multitudes de 10 o más individuos pueden hacer que el virus se propague. Por ejemplo, las reuniones incluyen fiestas, eventos deportivos, servicios religiosos y conferencias. Se pueden formar multitudes en las playas, los parques y las atracciones turísticas. Protéjase manteniéndose alejado de las grandes reuniones y multitudes.  •Pregunte a ahumada médico de qué otra forma puede tener las citas.Es posible que pueda tener citas sin tener que ir al consultorio del médico. Algunos médicos ofrecen citas por teléfono, video u otros tipos de citas. También puede obtener recetas de fatou medicamentos para varios meses de brandan vez.  •Manténgase a vidhya si debe que salir a trabajar.Es posible que tenga un trabajo que solo se puede hacer fuera de ahumada casa. Mantenga la distancia física entre usted y los demás trabajadores tanto paulo sea posible. Siga las reglas de ahumada empleador para que todos estén a vidhya.  ¿Qué dmitriy hacer si tengo COVID-19 y me estoy recuperando en casa?Los médicos le darán instrucciones específicas que debe seguir. Las siguientes son pautas generales para recordarle cómo mantener a los demás a vidhya hasta que usted esté indu:   •Lávese las amie frecuentemente.Use agua y jabón tanto paulo sea posible. Puede usar un desinfectante para amie que contenga alcohol, si no hay agua y jabón disponibles. No comparta toallas con nadie. Si usa toallas de papel, deséchelas en un cubo de basura recubierto que se guarda en ahumada habitación o área. Use un cubo de basura cubierto, si es posible.  •No salga de ahumada casa a menos que sea necesario.Es posible que tenga que ir al consultorio de ahumada médico para hacerse chequeos o para resurtir brandan receta. No llegue al consultorio del médico sin brandan trev. Los médicos tienen que hacer que fatou consultorios demarcus seguros para el personal y otros pacientes.  •No entre en contacto físico cercano con nadie, vidhya que sea necesario.Solo tenga un contacto físico cercano con brandan persona que lo cuide directamente, o con un bebé o suman que deba cuidar. Los miembros de la jenny y los amigos no deben visitarlo. Si es posible, quédese en un área o habitación separada de ahumada casa si vive con otras personas. Nadie debe entrar en el área o en la habitación excepto para brindarle cuidados. Puede visitar a los demás por teléfono, videochat, correo electrónico o sistemas similares. Es importante mantenerse conectado con los demás en ahumada ai mientras se recupera.  •Use un tapabocas mientras haya otras personas cerca de usted.[employer] puede ayudar a evitar que las gotitas propaguen el virus cuando usted habla, estornuda o tose. Póngase el tapabocas antes de que la persona entre en ahumada habitación o área. Recuérdele a la persona que se cubra la nariz y la boca antes de entrar a brindarle cuidados.  •No comparta artículos.No comparta platos, toallas ni otros artículos con nadie. Los artículos deben ser lavados después de usarlos.  •Proteja a ahumada bebé.Lávese las amie con agua y jabón con frecuencia oralia todo el día. Use un tapabocas mientras amamanta o mientras se extrae o se saca la leche materna. Si es posible, pídale a alguien que esté indu que cuide de ahumada bebé. Puede poner la leche materna en biberones para que la persona la use, si es necesario. Hable con ahumada médico si tiene preguntas o inquietudes acerca de cómo cuidar o vincularse con ahumada bebé. También le dirá cuándo debe traer a ahumada bebé para los chequeos y las vacunas. También le dirá qué hacer si brian que ahumada bebé está infectado con el nuevo virus.      •No manipule animales vivos.Hasta que se sepa más, es mejor no tocar, jugar o manipular animales vivos. Algunos animales, incluyendo las mascotas, holm sido infectados con el nuevo coronavirus. No manipule ni cuide animales hasta que esté indu. El cuidado incluye alimentar, acariciar y abrazar a ahumada mascota. No deje que ahumada mascota lo lama o comparta ahumada comida. Pídale a alguien que no esté infectado que cuide de ahumada mascota, si es posible. Si debe cuidar a brandan mascota, usa un tapabocas. Lávese las amie antes y después de cuidar a ahumada mascota.  •Siga las instrucciones de ahumada médico para estar cerca de los demás después de recuperarse.Deberá esperar al menos 10 días después de la aparición de los síntomas. Entonces deberá pasar 24 horas sin fiebre sin recibir medicamentos para la fiebre, y sin otros síntomas. La pérdida del sentido del gusto o el olfato puede continuar oralia varios meses. Se considera que está indu estar cerca de otros si abigail es el único síntoma. No se sabe con certeza si brandan persona recuperada puede transmitir el virus a otros, ni por cuánto tiempo. Ahumada médico puede darle instrucciones, paulo continuar con el distanciamiento social o usar un tapabocas cuando esté cerca de otras personas.  ¿Cómo dmitriy cuidar a alguien que tiene COVID-19?Si la persona vive en otro hogar, coordine un tiempo para brindar cuidados. Recuerde llevar algunos pares de guantes desechables y un tapabocas. Las siguientes son las pautas generales para ayudarle a cuidar de forma diaz a cualquier persona que tenga COVID-19:  •Lávese las amie frecuentemente.Lávese antes y después de entrar en la casa, área o habitación de la persona. Deseche las toallas de papel en un cubo de basura recubierto que tenga brandan tapa, si es posible.  •No permita que otros se acerquen a la persona.Nadie debe ingresar a la casa de la persona a menos que sea necesario. De ser posible, la persona debe estar en un área o habitación separada si vive con otras personas. Mantenga la jessee de la habitación cerrada a menos que necesite entrar o salir. Tanesha que otras personas llamen, charlen por video o envíen un correo electrónico a la persona si se siente lo suficientemente indu. La persona puede sentirse yulissa si se la mantiene separada oralia un maikol período de tiempo. La comunicación diaz puede ayudar a esta persona a mantenerse en contacto con ahumada jenny y amigos.  •Asegúrese de que la habitación de la persona tenga un buen flujo de aire.Puede abrir la ventana si el clima lo permite. También se puede encender el aire acondicionado para ayudar a que el aire se mueva.  •Comuníquese con la persona antes de entrar para brindarle cuidados.Asegúrese de que la persona use un tapabocas. Recuérdele que se lave las amie con agua y jabón. Puede usar un desinfectante para amie que contenga alcohol, si no hay agua y jabón disponibles. Colóquese el tapabocas antes de entrar al lugar a brindar cuidados.  •Use guantes mientras asha cuidados y limpia.Limpie los objetos que la persona usa a menudo. Limpie las encimeras, las superficies de cocción y los frentes y el interior del microondas y el refrigerador. Limpie la ducha, el sanitario, el área alrededor del sanitario, el lavabo, el área alrededor del lavabo y los grifos. Junte la ropa sucia o la ropa de cama. Lave y seque los artículos con el agua más caliente que permita la sami. Lave los platos y utensilios usados en [place] y jabonosa o en un lavavajillas.  •Todo lo que deseche debe ir a un cubo de basura recubierto.Cuando necesite vaciar la basura, cierre el extremo abierto de la cubierta y átela. [employer] ayuda a evitar que los artículos en los que está el virus se salgan de la basura. Quítese los guantes y deséchelos. Lávese las amie.      ¿Dónde puedo obtener más información?  •[facility] [employer] Disease Control and Prevention  [address] [place] [address]  Phone: [phone]  Web Address: http://www.cdc.gov      ¿Qué dmitriy hacer si pienso que yo o alguien que conozco está infectado?Tanesha lo siguiente para proteger a otras personas:   •Si se requiere atención de emergencia,avise al operador de la posible infección, o llame antes y avise al servicio de urgencias.  •Llame a un médicopara recibir instrucciones si los síntomas son leves. Cualquier persona que pueda estar infectada no debe llegar sin llamar devin. El médico deberá proteger a los miembros del personal y a otros pacientes.  •La persona que puede estar infectada debe usar un tapabocasmientras reciben atención médica. [employer] ayudará a reducir el riesgo de infectar a otras personas. Nadie que sea nancy de 2 años, que tenga problemas respiratorios o que no pueda quitárselo debe usar un tapabocas. El médico puede darle instrucciones para cualquier persona que no pueda usar un tapabocas.      Llame al número local de emergencias (911 en los [place]) o al departamento de emergencias si:  •Usted tiene dificultad para respirar o falta de aliento mientras descansa.  •Usted siente presión o dolor en el pecho que dura más de 5 minutos.  •Usted tiene confusión o es difícil despertarlo.  •Fatou labios o deo están azules.  •Usted tiene fiebre de 104 ºF (40 °C) o más.  ¿Cuándo dmitriy llamar a mi médico?  •No tiene síntomas de COVID-19 marisol tuvo contacto físico cercano dentro de los 14 días con alguien que juan r positivo.  •Usted tiene preguntas o inquietudes acerca de ahumada condición o cuidado.      ACUERDOS SOBRE AHUMADA CUIDADO:  Usted tiene el derecho de ayudar a planear ahumada cuidado. Aprenda todo lo que pueda sobre ahumada condición y paulo darle tratamiento. Discuta fatou opciones de tratamiento con fatou médicos para decidir el cuidado que usted desea recibir. Usted siempre tiene el derecho de rechazar el tratamiento.
Statement Selected